# Patient Record
Sex: MALE
[De-identification: names, ages, dates, MRNs, and addresses within clinical notes are randomized per-mention and may not be internally consistent; named-entity substitution may affect disease eponyms.]

---

## 2019-04-22 PROBLEM — Z00.00 ENCOUNTER FOR PREVENTIVE HEALTH EXAMINATION: Status: ACTIVE | Noted: 2019-04-22

## 2019-04-23 ENCOUNTER — APPOINTMENT (OUTPATIENT)
Dept: ORTHOPEDIC SURGERY | Facility: CLINIC | Age: 54
End: 2019-04-23
Payer: COMMERCIAL

## 2019-04-23 VITALS
BODY MASS INDEX: 29.47 KG/M2 | SYSTOLIC BLOOD PRESSURE: 160 MMHG | HEIGHT: 75 IN | HEART RATE: 72 BPM | WEIGHT: 237 LBS | DIASTOLIC BLOOD PRESSURE: 77 MMHG

## 2019-04-23 DIAGNOSIS — M17.11 UNILATERAL PRIMARY OSTEOARTHRITIS, RIGHT KNEE: ICD-10-CM

## 2019-04-23 DIAGNOSIS — Z78.9 OTHER SPECIFIED HEALTH STATUS: ICD-10-CM

## 2019-04-23 DIAGNOSIS — Z86.79 PERSONAL HISTORY OF OTHER DISEASES OF THE CIRCULATORY SYSTEM: ICD-10-CM

## 2019-04-23 PROCEDURE — 73564 X-RAY EXAM KNEE 4 OR MORE: CPT | Mod: RT

## 2019-04-23 PROCEDURE — 99204 OFFICE O/P NEW MOD 45 MIN: CPT

## 2019-04-23 RX ORDER — AMLODIPINE BESYLATE 5 MG/1
TABLET ORAL
Refills: 0 | Status: ACTIVE | COMMUNITY

## 2019-04-23 NOTE — HISTORY OF PRESENT ILLNESS
[de-identified] : 53 year old male  and avid power  presents with 3 weeks of right knee pain and swelling after twisting his knee at the gym.  His knee swelled to the size of a cantaloupe. He states that the swelling finally went down 2 days ago. He has been icing for pain relief. He took Ibuprofen on one occasion. He feels a sharp poke of pain in the knee when climbing steps.  He was diagnosed with a torn meniscus in 2014. He states that he did not have pain at that time and did not require any treatment.

## 2019-04-23 NOTE — DISCUSSION/SUMMARY
[de-identified] : The patient has evidence of mild arthritis of the right knee. He likely had a recent exacerbation from twisting injury. The symptoms have largely resolved and his exam is mostly benign today. He has been told in the past that he had a torn meniscus which may be the case as well. At this point he is not very symptomatic and will resume his normal activities. He will return as needed.

## 2019-04-23 NOTE — REVIEW OF SYSTEMS
[Joint Stiffness] : joint stiffness [Joint Pain] : joint pain [Joint Swelling] : joint swelling [Negative] : Endocrine

## 2019-04-23 NOTE — PHYSICAL EXAM
[LE] : Sensory: Intact in bilateral lower extremities [Knee] : patellar 2+ and symmetric bilaterally [Ankle] : ankle 2+ and symmetric bilaterally [DP] : dorsalis pedis 2+ and symmetric bilaterally [Plant] : plantar 2+ and symmetric bilaterally [PT] : posterior tibial 2+ and symmetric bilaterally [Normal] : Alert and in no acute distress [Poor Appearance] : well-appearing [Acute Distress] : not in acute distress [Obese] : not obese [de-identified] : AP, lateral, tunnel and sunrise x-rays of the right knee taken today demonstrate no fracture or dislocation. There are degenerative changes at the patella and at the medial compartment. [de-identified] : The patient has no respiratory distress. Mood and affect are normal. The patient is alert and oriented to person, place and time.There is no pain with active or passive motion of the hips. There is no tenderness of either hip. Examination of the knees demonstrates no swelling or deformity. There is mild medial jointline tenderness of the right knee. There is anterior pain with knee flexion. The collateral and cruciate ligaments are stable bilaterally. Range of motion 0-120° at both knees. Selena test is negative. The calves are soft and nontender. The skin is intact. There is no lymphedema.